# Patient Record
Sex: MALE | Race: BLACK OR AFRICAN AMERICAN | NOT HISPANIC OR LATINO | URBAN - METROPOLITAN AREA
[De-identification: names, ages, dates, MRNs, and addresses within clinical notes are randomized per-mention and may not be internally consistent; named-entity substitution may affect disease eponyms.]

---

## 2021-06-03 ENCOUNTER — OFFICE VISIT (OUTPATIENT)
Dept: URBAN - METROPOLITAN AREA CLINIC 118 | Facility: CLINIC | Age: 21
End: 2021-06-03
Payer: COMMERCIAL

## 2021-06-03 ENCOUNTER — LAB OUTSIDE AN ENCOUNTER (OUTPATIENT)
Dept: URBAN - METROPOLITAN AREA CLINIC 118 | Facility: CLINIC | Age: 21
End: 2021-06-03

## 2021-06-03 ENCOUNTER — DASHBOARD ENCOUNTERS (OUTPATIENT)
Age: 21
End: 2021-06-03

## 2021-06-03 DIAGNOSIS — K61.1 PERI-RECTAL ABSCESS: ICD-10-CM

## 2021-06-03 DIAGNOSIS — K62.5 RECTAL BLEEDING: ICD-10-CM

## 2021-06-03 PROCEDURE — 99204 OFFICE O/P NEW MOD 45 MIN: CPT | Performed by: INTERNAL MEDICINE

## 2021-06-03 RX ORDER — AMOXICILLIN 250 MG/1
1 CAPSULE CAPSULE ORAL THREE TIMES A DAY
Qty: 42 CAPSULE | Refills: 3 | OUTPATIENT
Start: 2021-06-03 | End: 2021-07-29

## 2021-06-03 NOTE — HPI-TODAY'S VISIT:
pt homesexual bm presents for 3 months complaints of bleeding and pustular drainge anal area. pt reports stable bm's and starting noted pain with draninge. Reprots no f/c or other associated sympoms. Denies UGI complaints. pt seen pmd, told jannette-anal abcess, given antibiotics and referred to GI. Pt told symptoms would improve then woren. Pty insist drainge from inside anal area. Denies other compaints.

## 2021-06-11 ENCOUNTER — OFFICE VISIT (OUTPATIENT)
Dept: URBAN - METROPOLITAN AREA SURGERY CENTER 23 | Facility: SURGERY CENTER | Age: 21
End: 2021-06-11

## 2021-08-13 ENCOUNTER — OFFICE VISIT (OUTPATIENT)
Dept: URBAN - METROPOLITAN AREA SURGERY CENTER 23 | Facility: SURGERY CENTER | Age: 21
End: 2021-08-13